# Patient Record
Sex: MALE | Race: WHITE | NOT HISPANIC OR LATINO | Employment: FULL TIME | URBAN - METROPOLITAN AREA
[De-identification: names, ages, dates, MRNs, and addresses within clinical notes are randomized per-mention and may not be internally consistent; named-entity substitution may affect disease eponyms.]

---

## 2019-07-30 ENCOUNTER — TRANSFERRED RECORDS (OUTPATIENT)
Dept: HEALTH INFORMATION MANAGEMENT | Facility: CLINIC | Age: 24
End: 2019-07-30

## 2019-08-27 ENCOUNTER — TRANSFERRED RECORDS (OUTPATIENT)
Dept: HEALTH INFORMATION MANAGEMENT | Facility: CLINIC | Age: 24
End: 2019-08-27

## 2019-08-29 ENCOUNTER — TRANSFERRED RECORDS (OUTPATIENT)
Dept: HEALTH INFORMATION MANAGEMENT | Facility: CLINIC | Age: 24
End: 2019-08-29

## 2019-10-04 ENCOUNTER — TELEPHONE (OUTPATIENT)
Dept: ORTHOPEDICS | Facility: CLINIC | Age: 24
End: 2019-10-04

## 2019-10-04 NOTE — TELEPHONE ENCOUNTER
[facility] Call Center    Phone Message    May a detailed message be left on voicemail: yes    Reason for Call: Other: pt calling to see if anyone has reviewed his MRI that was sent over and to discuss next steps, please call pt     Action Taken: Message routed to:  [facility]o         Called and left a voicemail. Informed pt that we haven't reviewed his MRI yet because [facility] haven't forwarded pt's MRI to us. Also informed pt that I called [facility] today to have them push pt's MRI over. Once it's over, we'll review it and determine which doctor is appropriate for the pt to be seen.      Felix, JONATHAN

## 2019-10-16 NOTE — TELEPHONE ENCOUNTER
[facility] Call Center    Phone Message    May a detailed message be left on voicemail: yes    Reason for Call: Other: Kevon is requesting a call back from the care team to discuss the results and next steps of the MRI from [facility]. Please reach out to discuss.     Action Taken: Message routed to:  [facility] Ortho

## 2019-11-14 ENCOUNTER — TELEPHONE (OUTPATIENT)
Dept: ORTHOPEDICS | Facility: CLINIC | Age: 24
End: 2019-11-14

## 2019-11-14 NOTE — TELEPHONE ENCOUNTER
RN left a detailed VM to patient, patient is appropriate to be seen by Sports Med and not ortho.    Reynaldo Ba RN

## 2019-11-14 NOTE — TELEPHONE ENCOUNTER
[facility] Call Center    Phone Message    May a detailed message be left on voicemail: yes    Reason for Call: Other: Patient called as he had left a message on 10/16/19 and has not heard anythng back (see under NOTES tab).  Patient is requesting a call back from the care team to discuss the results and next steps of the MRI from [facility]. Please follow up with patient.  THank you!     Action Taken: Message routed to:  [facility]

## 2019-11-20 NOTE — TELEPHONE ENCOUNTER
DIAGNOSIS: L Knee pain MRI from [facility]. ivonne Ayala   APPOINTMENT DATE: 11.22.19   NOTES STATUS DETAILS   OFFICE NOTE from referring provider N/A    OFFICE NOTE from other specialist N/A    DISCHARGE SUMMARY from hospital Care Everywhere 8.29.19 [facility]  6.5.14   DISCHARGE REPORT from the ER N/A    OPERATIVE REPORT N/A    MEDICATION LIST Care Everywhere    IMPLANT RECORD/STICKER N/A    LABS     CBC/DIFF N/A    CULTURES N/A    INJECTIONS DONE IN RADIOLOGY N/A    MRI recieved 8.29.19   CT SCAN N/A    XRAYS (IMAGES & REPORTS) recieved 7.30.19     TUMOR     PATHOLOGY  Slides & report N/A      11.20.19 SALOME 1:45 PM  Requested images from [facility] for xrays and MRI at [facility].      Called [facility] for imaging they will push shortly 11/21

## 2019-11-22 ENCOUNTER — OFFICE VISIT (OUTPATIENT)
Dept: ORTHOPEDICS | Facility: CLINIC | Age: 24
End: 2019-11-22
Payer: COMMERCIAL

## 2019-11-22 ENCOUNTER — PRE VISIT (OUTPATIENT)
Dept: ORTHOPEDICS | Facility: CLINIC | Age: 24
End: 2019-11-22

## 2019-11-22 VITALS — HEIGHT: 75 IN | BODY MASS INDEX: 29.97 KG/M2 | WEIGHT: 241 LBS

## 2019-11-22 DIAGNOSIS — M23.42 LOOSE BODY IN KNEE, LEFT: Primary | ICD-10-CM

## 2019-11-22 ASSESSMENT — MIFFLIN-ST. JEOR: SCORE: 2173.8

## 2019-11-22 NOTE — PROGRESS NOTES
"[facility]  Orthopedics  Edd Hernandez MD  2019     Name: Kevon Koenig  MRN: [id number]  Age: 24 year old  : 1995  Referring provider: Referred Self     Chief Complaint: Consult (Left knee)    Date of Injury: 2019    History of Present Illness:   Kevon Koenig is a 24 year old male who presents today for evaluation of left knee pain. The patient states that at the end of the July he came home, the dog ran into him, and left patella dislocated. Endorsed intermittent swelling afterwards but none as of late. Minimal pain and no mechanical symptoms. No recurrence in dislocation. The patient  MRI from [facility] on 2019 showed a lateral patellar dislocation, among other findings listed below, after the patient fell while twisting his knee. No family history of coagulative disorders.     Review of Systems:   A 10-point review of systems was obtained and is negative except for as noted in the HPI.     Medications:   No current outpatient medications on file.    Allergies:  Patient has no known allergies.     Past Medical History:  No past medical history.    Past Surgical History:  No past surgical history.     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   Denies drug use.    Family History:  No pertinent family history.    Physical Examination:  Height 1.905 m (6' 3\"), weight 109.3 kg (241 lb).  General: Alert, oriented, no distress.  Skin: Cool to touch without erythema, ecchymosis, or lesions. No dystrophic changes.  Neuro: Neurovascularly intact distally. Sensation intact to light touch.  Cardiovascular: Capillary refill brisk.  Right Knee: No effusion. Range of motion from 5 degrees of hyperextension to 140 degrees of flexion on the right, Positive patellar tilt. 2 medial and lateral translation.  Left knee. ROM symmetrical. Trace effusion. No Lachman, no posterior drawer. Stable to varus and valgus stress. Positive patellar tilt. 2 medial and " lateral translation. No medial or lateral joint line tenderness. No tenderness over the lateral condyle.     Imaging:   MRI left knee (08/29/2019)      I have independently reviewed the above imaging studies; the results were discussed with the patient.     Assessment:   24 year old male with a patellar dislocation in the left knee at the end of July. Left knee patellar dislocation with a lateral femoral condyle lesion and a loose body. He has not had any recurrence in this and his knee is asymptomatic as of late. We discussed treatment options including a watch and wait vs surgical management with arthroscopic resection. Since the patient is overall doing well and has no complaints I advised that surgery is not indicated.      Plan:   -Follow up in 6 months for reevaluation. Provided signs and reasons to return sooner.     Scribe Disclosure:  I, Rigoberto Lu, am serving as a scribe to document services personally performed by Edd Hernandez MD at this visit, based upon the provider's statements to me. All documentation has been reviewed by the aforementioned provider prior to being entered into the official medical record.

## 2019-11-22 NOTE — LETTER
Date:November 25, 2019      Patient was self referred, no letter generated. Do not send.        [facility] Health Information

## 2019-11-22 NOTE — LETTER
"  2019      RE: Kevon Koenig  [address] [place] [address]       [facility]  Orthopedics  Edd Hernandez MD  2019     Name: Kevon Koenig  MRN: [id number]  Age: 24 year old  : 1995  Referring provider: Referred Self     Chief Complaint: Consult (Left knee)    Date of Injury: 2019    History of Present Illness:   Kevon Koenig is a 24 year old male who presents today for evaluation of left knee pain. The patient states that at the end of the July he came home, the dog ran into him, and left patella dislocated. Endorsed intermittent swelling afterwards but none as of late. Minimal pain and no mechanical symptoms. No recurrence in dislocation. The patient  MRI from [facility] on 2019 showed a lateral patellar dislocation, among other findings listed below, after the patient fell while twisting his knee. No family history of coagulative disorders.     Review of Systems:   A 10-point review of systems was obtained and is negative except for as noted in the HPI.     Medications:   No current outpatient medications on file.    Allergies:  Patient has no known allergies.     Past Medical History:  No past medical history.    Past Surgical History:  No past surgical history.     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   Denies drug use.    Family History:  No pertinent family history.    Physical Examination:  Height 1.905 m (6' 3\"), weight 109.3 kg (241 lb).  General: Alert, oriented, no distress.  Skin: Cool to touch without erythema, ecchymosis, or lesions. No dystrophic changes.  Neuro: Neurovascularly intact distally. Sensation intact to light touch.  Cardiovascular: Capillary refill brisk.  Right Knee: No effusion. Range of motion from 5 degrees of hyperextension to 140 degrees of flexion on the right, Positive patellar tilt. 2 medial and lateral translation.  Left knee. ROM symmetrical. Trace effusion. No Lachman, " no posterior drawer. Stable to varus and valgus stress. Positive patellar tilt. 2 medial and lateral translation. No medial or lateral joint line tenderness. No tenderness over the lateral condyle.     Imaging:   MRI left knee (08/29/2019)      I have independently reviewed the above imaging studies; the results were discussed with the patient.     Assessment:   24 year old male with a patellar dislocation in the left knee at the end of July. Left knee patellar dislocation with a lateral femoral condyle lesion and a loose body. He has not had any recurrence in this and his knee is asymptomatic as of late. We discussed treatment options including a watch and wait vs surgical management with arthroscopic resection. Since the patient is overall doing well and has no complaints I advised that surgery is not indicated.      Plan:   -Follow up in 6 months for reevaluation. Provided signs and reasons to return sooner.     Scribe Disclosure:  I, Rigoberto Lu, am serving as a scribe to document services personally performed by Edd Hernandez MD at this visit, based upon the provider's statements to me. All documentation has been reviewed by the aforementioned provider prior to being entered into the official medical record.         Edd Hernandez MD

## 2020-03-11 ENCOUNTER — HEALTH MAINTENANCE LETTER (OUTPATIENT)
Age: 25
End: 2020-03-11

## 2020-11-24 ENCOUNTER — APPOINTMENT (OUTPATIENT)
Dept: GENERAL RADIOLOGY | Facility: CLINIC | Age: 25
End: 2020-11-24
Attending: EMERGENCY MEDICINE
Payer: OTHER MISCELLANEOUS

## 2020-11-24 ENCOUNTER — HOSPITAL ENCOUNTER (EMERGENCY)
Facility: CLINIC | Age: 25
Discharge: HOME | End: 2020-11-24
Attending: EMERGENCY MEDICINE | Admitting: EMERGENCY MEDICINE
Payer: OTHER MISCELLANEOUS

## 2020-11-24 VITALS
TEMPERATURE: 98.6 F | HEART RATE: 92 BPM | SYSTOLIC BLOOD PRESSURE: 167 MMHG | OXYGEN SATURATION: 99 % | WEIGHT: 241 LBS | BODY MASS INDEX: 30.12 KG/M2 | DIASTOLIC BLOOD PRESSURE: 88 MMHG | RESPIRATION RATE: 16 BRPM

## 2020-11-24 DIAGNOSIS — S68.011A: ICD-10-CM

## 2020-11-24 DIAGNOSIS — S61.011A: ICD-10-CM

## 2020-11-24 PROBLEM — S72.415D CLOSED NONDISPLACED FRACTURE OF CONDYLE OF LEFT FEMUR WITH ROUTINE HEALING: Status: ACTIVE | Noted: 2019-08-30

## 2020-11-24 PROBLEM — S82.042D CLOSED DISPLACED COMMINUTED FRACTURE OF LEFT PATELLA WITH ROUTINE HEALING: Status: ACTIVE | Noted: 2019-08-30

## 2020-11-24 PROCEDURE — 11044 DBRDMT BONE 1ST 20 SQ CM/<: CPT | Performed by: EMERGENCY MEDICINE

## 2020-11-24 PROCEDURE — 99284 EMERGENCY DEPT VISIT MOD MDM: CPT | Mod: 25 | Performed by: EMERGENCY MEDICINE

## 2020-11-24 PROCEDURE — 73140 X-RAY EXAM OF FINGER(S): CPT | Mod: RT

## 2020-11-24 PROCEDURE — 12042 INTMD RPR N-HF/GENIT2.6-7.5: CPT | Mod: 59 | Performed by: EMERGENCY MEDICINE

## 2020-11-24 RX ORDER — CEPHALEXIN 500 MG/1
500 CAPSULE ORAL 4 TIMES DAILY
Qty: 28 CAPSULE | Refills: 0 | Status: SHIPPED | OUTPATIENT
Start: 2020-11-24 | End: 2020-12-01

## 2020-11-24 ASSESSMENT — ENCOUNTER SYMPTOMS
HEADACHES: 0
SORE THROAT: 0
DIFFICULTY URINATING: 0
ARTHRALGIAS: 1
WOUND: 1
CHILLS: 0
FEVER: 0
COUGH: 0
ABDOMINAL PAIN: 0

## 2020-11-24 NOTE — ED PROVIDER NOTES
History     Chief Complaint   Patient presents with     Laceration     Pt pinched his R thumb between two objects and then pulled it out and pulled the top off his thumb.     HPI  Kevon Koenig is a 25 year old left-hand-dominant male with history of femur and patellar fracture, with injury to distal tip of right thumb.  Patient reports that he got his thumb caught between a chain and a sprocket.  He pulled his hand out and portion of the tip of his thumb was removed.  Hand was gloved at time.  Patient thinks his last tetanus was in the last 2 to 3 years.  Pain is moderate in severity.  Worse with any direct pressure.  Bleeding controlled with gauze bandage.  Injury occurred approximately 30 minutes prior to arrival emergency department.  He took ibuprofen for pain control prior to arrival in hospital.  Injury occurred at work.  Otherwise feeling well and in his usual state of health.    Chart review shows that last tetanus immunization was 10/21/2018.    The patient's PMHx, Surgical Hx, Allergies, and Medications were all reviewed with the patient.    Allergies:  No Known Allergies    Problem List:    Patient Active Problem List    Diagnosis Date Noted     Closed displaced comminuted fracture of left patella with routine healing 08/30/2019     Priority: Medium     Closed nondisplaced fracture of condyle of left femur with routine healing 08/30/2019     Priority: Medium        Past Medical History:    History reviewed. No pertinent past medical history.    Past Surgical History:    History reviewed. No pertinent surgical history.    Family History:    History reviewed. No pertinent family history.    Social History:  Marital Status:  Single [1]  Social History     Tobacco Use     Smoking status: Never Smoker     Smokeless tobacco: Never Used   Substance Use Topics     Alcohol use: Yes     Comment: socially     Drug use: Never        Medications:         cephALEXin (KEFLEX) 500 MG capsule          Review of  Systems   Constitutional: Negative for chills and fever.   HENT: Negative for sore throat.    Eyes: Negative for visual disturbance.   Respiratory: Negative for cough.    Cardiovascular: Negative for chest pain.   Gastrointestinal: Negative for abdominal pain.   Genitourinary: Negative for difficulty urinating.   Musculoskeletal: Positive for arthralgias.   Skin: Positive for wound.   Neurological: Negative for headaches.       Physical Exam   BP: (!) 167/88  Pulse: 89  Temp: 98.6  F (37  C)  Resp: 18  Weight: 109.3 kg (241 lb)  SpO2: 98 %    Physical Exam  GEN: Awake, alert, and cooperative. No acute distress  HENT: MMM. External ears and nose normal bilaterally.  Atraumatic  EYES: EOM intact. Conjunctiva clear.   NECK: Supple, symmetric.  CV : Regular rate and rhythm.  Brisk capillary refill.  PULM: Normal effort.  Speaking in full sentences.  NEURO: Normal speech.  Answering questions and interacting appropriately.   EXT: Distal tip of right thumb amputated from traumatic injury.  Nail bed is missing.  Small exposed bone.  Does not involve the distal interphalangeal joint.  Able to flex and extend interphalangeal joints against resistance.  Sensation intact to light touch.  INT: Warm and dry. See EXT above. See Photos below.                                    ED [facility]     -Laceration Repair    Date/Time: 11/25/2020 12:53 PM  Performed by: Carlyle Pringle MD  Authorized by: Carlyle Pringle MD       ANESTHESIA (see MAR for exact dosages):     Anesthesia method:  Nerve block    Block location:  Thumb right    Block needle gauge:  27 G    Block anesthetic:  Bupivacaine 0.25% w/o epi    Block technique:  Digital    Block injection procedure:  Negative aspiration for blood, incremental injection, anatomic landmarks palpated, anatomic landmarks identified and introduced needle    Block outcome:  Anesthesia achieved      LACERATION DETAILS     Location:  Finger     Finger location:  R thumb    Length (cm):  3    REPAIR TYPE:     Repair type:  Complex      EXPLORATION:     Limited defect created (wound extended): yes      Hemostasis achieved with:  Tourniquet    Wound exploration: wound explored through full range of motion and entire depth of wound probed and visualized      Wound extent: underlying fracture      Wound extent: no foreign body      Contaminated: no      TREATMENT:     Irrigation solution:  Sterile water    Irrigation volume:  250    Debridement:  Extensive    Undermining:  Extensive    Scar revision: no      SKIN REPAIR     Repair method:  Sutures    Suture size:  4-0    Suture material:  Nylon    Suture technique:  Simple interrupted    Number of sutures:  11    APPROXIMATION     Approximation:  Loose    POST-PROCEDURE DETAILS     Dressing:  Non-adherent dressing and sterile dressing      PROCEDURE   Patient Tolerance:  Patient tolerated the procedure well with no immediate complications     Partial amputation of distal phanax of right thumb. Bone rongeur used to clip bone and removal of tissue to create flap to allow closure. Nail removed              Critical Care time:  none               Results for orders placed or performed during the hospital encounter of 11/24/20 (from the past 24 hour(s))   XR Finger Right G/E 2 Views    Narrative    FINGER RIGHT TWO OR MORE VIEWS 11/24/2020 3:57 PM    HISTORY: Crush injury/avulsion of thumb.    COMPARISON: None.    FINDINGS: There is soft tissue injury of the tip of the thumb which  extends to the tuft of the thumb. There is a tiny ossification  adjacent to the tuft of the thumb likely representing a very small  chip fracture from the tuft. No other fractures are identified. Joint  spaces are grossly well-maintained.      Impression    IMPRESSION: Open fracture of the tuft of the distal aspect of the  right thumb with associated overlying soft tissue injury.    SURESH MICHEL MD       Medications - No data to  display    Assessments & Plan (with Medical Decision Making)   25 year old left hand dominant male with partial amputation of distal phalanx of right thumb.  On arrival to Emergency Department, vital signs were concerning, temperature 90.6, pulse 89, respiratory rate 18, SPO2 98% on room air.  Examination detailed above and see photographs.  CMS intact to thumb.  Flexion extension against resistance of interphalangeal joints.  Clinical imaging shows small chip fracture of the tuft.  Images reviewed personally as well as report radiology which is noted above.  Anesthesia was achieved with a digital block with 0.25% ropivacaine without epinephrine.  Tissue was excised, thumbnail removed, and bone removed with rongeur to allow for creation of flap to facilitate closure.  Close primarily with 4-0 nylon sutures.  Patient's tetanus is up-to-date.  Patient will need to follow-up with hand surgery for further cares.  Orthopedic  referral placed.  Patient feels he will do fine with ibuprofen and Tylenol for pain control.  Has had oxycodone for prior fracture I do not like side effects.  He was offered narcotic pain medications but declined.  Will treat with 7-day course of cephalexin.  Prescription sent to preferred pharmacy.  Follow-up, wound care, and ED return precautions discussed with patient.  Expresses agreement understanding of plan and discharged in improved condition.    I have reviewed the nursing notes.         Discharge Medication List as of 11/24/2020  6:31 PM      START taking these medications    Details   cephALEXin (KEFLEX) 500 MG capsule Take 1 capsule (500 mg) by mouth 4 times daily for 7 days, Disp-28 capsule, R-0, E-Prescribe             Final diagnoses:   Traumatic amputation of tip of right thumb, initial encounter   Laceration of thumb, right, complicated, initial encounter     Carlyle Pringle MD    11/24/2020   [facility] EMERGENCY DEPT    Disclaimer: This note consists  of words and symbols derived from keyboarding and dictation using voice recognition software.  As a result, there may be errors that have gone undetected.  Please consider this when interpreting information found in this note.             Carlyle Pringle MD  11/25/20 3229

## 2020-11-24 NOTE — ED AVS SNAPSHOT
[facility] Emergency Dept  [address] [address]  Phone: [phone]  Fax: [phone]                                    Kevon Koenig   MRN: [id number]    Department: [facility] Emergency Dept   Date of Visit: 11/24/2020           After Visit Summary Signature Page    I have received my discharge instructions, and my questions have been answered. I have discussed any challenges I see with this plan with the nurse or doctor.    ..........................................................................................................................................  Patient/Patient Representative Signature      ..........................................................................................................................................  Patient Representative Print Name and Relationship to Patient    ..................................................               ................................................  Date                                   Time    ..........................................................................................................................................  Reviewed by Signature/Title    ...................................................              ..............................................  Date                                               Time          22EPIC Rev 08/18

## 2020-11-25 NOTE — ED NOTES
Per MD's verbal order, Vaseline gauze was applied and sterile gauze was wrapped around the R thumb. Pt was instructed to clean and change dressing once it becomes visibly soiled.

## 2021-01-03 ENCOUNTER — HEALTH MAINTENANCE LETTER (OUTPATIENT)
Age: 26
End: 2021-01-03

## 2021-04-25 ENCOUNTER — HEALTH MAINTENANCE LETTER (OUTPATIENT)
Age: 26
End: 2021-04-25

## 2021-10-10 ENCOUNTER — HEALTH MAINTENANCE LETTER (OUTPATIENT)
Age: 26
End: 2021-10-10

## 2022-05-22 ENCOUNTER — HEALTH MAINTENANCE LETTER (OUTPATIENT)
Age: 27
End: 2022-05-22

## 2022-09-18 ENCOUNTER — HEALTH MAINTENANCE LETTER (OUTPATIENT)
Age: 27
End: 2022-09-18

## 2023-06-04 ENCOUNTER — HEALTH MAINTENANCE LETTER (OUTPATIENT)
Age: 28
End: 2023-06-04